# Patient Record
Sex: MALE | Race: WHITE
[De-identification: names, ages, dates, MRNs, and addresses within clinical notes are randomized per-mention and may not be internally consistent; named-entity substitution may affect disease eponyms.]

---

## 2017-01-21 ENCOUNTER — HOSPITAL ENCOUNTER (EMERGENCY)
Age: 22
Discharge: HOME | End: 2017-01-21
Payer: COMMERCIAL

## 2017-01-21 DIAGNOSIS — H65.92: Primary | ICD-10-CM

## 2017-01-21 PROCEDURE — 99283 EMERGENCY DEPT VISIT LOW MDM: CPT

## 2019-09-24 ENCOUNTER — HOSPITAL ENCOUNTER (EMERGENCY)
Dept: HOSPITAL 76 - ED | Age: 24
Discharge: HOME | End: 2019-09-24
Payer: COMMERCIAL

## 2019-09-24 VITALS — SYSTOLIC BLOOD PRESSURE: 140 MMHG | DIASTOLIC BLOOD PRESSURE: 97 MMHG
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
## 2019-09-24 DIAGNOSIS — S52.125A: Primary | ICD-10-CM
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
## 2019-09-24 PROCEDURE — 99283 EMERGENCY DEPT VISIT LOW MDM: CPT

## 2019-09-24 PROCEDURE — 73080 X-RAY EXAM OF ELBOW: CPT

## 2019-09-24 PROCEDURE — 99284 EMERGENCY DEPT VISIT MOD MDM: CPT

## 2019-09-24 NOTE — XRAY REPORT
Reason:  fall, L elbow pain

Procedure Date:  09/24/2019   

Accession Number:  [id number] / [id number]                    

Procedure:  XR  - Elbow 3 View LT CPT Code:  

 

FULL RESULT:

 

 

EXAM:

LEFT ELBOW RADIOGRAPHY

 

EXAM DATE: 9/24/2019 02:13 AM

 

CLINICAL HISTORY: Fall with elbow pain.

 

COMPARISON: None.

 

TECHNIQUE: 4 views.

 

FINDINGS:

Bones: There is a subtle nondisplaced radial head fracture.

 

Joints: Positive elbow effusion. No dislocation.

 

Soft Tissues: Equivocal mild proximal forearm soft tissue swelling.

IMPRESSION:

Subtle nondisplaced radial head fracture. Elbow effusion.

 

[facility]

## 2019-09-24 NOTE — ED PHYSICIAN DOCUMENTATION
PD HPI UPPER EXT INJURY





- Stated complaint


Stated Complaint: LEFT ELBOW PX





- Chief complaint


Chief Complaint: Trauma Ext





- History obtained from


History obtained from: Patient





- History of Present Illness


Location: Left, Elbow


Type of injury: Fall


Where injury occurred: [place]


Timing - onset: Yesterday


Timing - duration: Days (1)


Timing - details: Gradual onset


Pain level max: 9


Pain level now: 8


Improved by: Rest, Ice, Immobilization


Worsened by: Moving, Palpating


Associated symptoms: Swelling.  No: Weakness, Numbness, Tingling


Contributing factors: No: Anticoagulated


Recently seen: Not recently seen





- Additonal information


Additional information: 





pt is right handed. Fell on L elbow playing flag football.





Review of Systems


Constitutional: denies: Fever


Neurologic: denies: Focal weakness, Numbness





PD PAST MEDICAL HISTORY





- Past Medical History


Past Medical History: No





- Past Surgical History


Past Surgical History: Yes





- Present Medications


Home Medications: 


                                Ambulatory Orders











 Medication  Instructions  Recorded  Confirmed


 


Cephalexin [Keflex] 500 mg PO TID #21 capsule 01/21/17 


 


Ibuprofen [Motrin] 600 mg PO TID #20 tab 01/21/17 


 


Tramadol HCl 50 mg PO Q6H PRN #20 tablet 01/21/17 


 


Hydrocodone/Acetaminophen 1 - 2 each PO Q6H PRN #14 tablet 09/24/19 





[Hydrocodon-Acetaminophen 5-325]   














- Allergies


Allergies/Adverse Reactions: 


                                    Allergies











Allergy/AdvReac Type Severity Reaction Status Date / Time


 


No Known Drug Allergies Allergy   Verified 01/21/17 10:43














- Living Situation


Living Arrangement: reports: At home





- Social History


Does the pt smoke?: No


Smoking Status: Never smoker


Does the pt drink ETOH?: Yes


Does the pt have substance abuse?: No





- Immunizations


Immunizations are current?: Yes





PD ED PE NORMAL





- Vitals


Vital signs reviewed: Yes





- General


General: Alert and oriented X 3, No acute distress, Well developed/nourished





- Derm


Derm: Warm and dry





- Extremities


Extremities: Other (L elbow - TTP distal humerus. NVI. Pain with 

pronation/supination. Mild swelling. )





- Neuro


Neuro: Alert and oriented X 3





- Psych


Psych: Normal mood, Normal affect





Results





- Vitals


Vitals: 


                               Vital Signs - 24 hr











  09/24/19 09/24/19





  01:26 03:10


 


Temperature 36.7 C 37.5 C


 


Heart Rate 100 102 H


 


Respiratory 18 17





Rate  


 


Blood Pressure 147/98 H 140/97 H


 


O2 Saturation 99 99








                                     Oxygen











O2 Source                      Room air

















- Rads (name of study)


  ** Left elbow x-ray


Radiology: Prelim report reviewed, EMP read contemporaneously, See rad report 

(Subtle nondisplaced radial head fracture. Elbow effusion. )





PD MEDICAL DECISION MAKING





- ED course


Complexity details: reviewed results, re-evaluated patient, considered 

differential, d/w patient


ED course: 





24-year-old male with a nondisplaced left radial head fracture and small elbow 

effusion.  Placed in a sling for comfort.  Will utilize early mobilization and 

if he does not improve, will see orthopedics this week and can possibly have the

joint aspirated if needed. He will need orthopedic follow-up either way.  

Neurovascularly intact.  Will prescribe pain medication for home as well.  

Patient counseled regarding signs and symptoms for which I believe and urgent 

re-evaluation would be necessary. Patient with good understanding of and 

agreement to plan and is comfortable going home at this time





This document was made in part using voice recognition software. While efforts 

are made to proofread this document, sound alike and grammatical errors may 

occur.





Departure





- Departure


Disposition: 01 Home, Self Care


Clinical Impression: 


Left radial head fracture


Qualifiers:


 Encounter type: initial encounter Fracture type: closed Fracture alignment: 

nondisplaced Qualified Code(s): S52.125A - Nondisplaced fracture of head of left

radius, initial encounter for closed fracture





Condition: Good


Instructions:  ED Fx Radial Head


Follow-Up: 


[facility] [Provider Group]


[facility] [Provider Group]


Prescriptions: 


Hydrocodone/Acetaminophen [Hydrocodon-Acetaminophen 5-325] 1 - 2 each PO Q6H PRN

#14 tablet


 PRN Reason: pain


Comments: 


Return if you worsen.  Wear the sling for the next 2 to 3 days.  Start to gently

move the elbow after that.  Follow-up closely with orthopedics, preferably this 

week for further evaluation and care.  They may need to remove fluid from the 

elbow joint.  





Do not drink alcohol or drive while on narcotic pain medicine. 


Note that many narcotic pain relievers also contain tylenol/acetaminophen. 

Please ensure that your total dose of acetaminophen from all sources does not 

exceed 3 grams (3000mg) per day. 


You may constipated on this medication, take a stool softener such as "Colace" 

twice a day while you are on it. Also recommend a over-the-counter laxative such

as senna or MiraLAX any day that you do not have a bowel movement. 


If you received narcotic pain medication in the emergency department, do not 

drive or operate machinery for the next 24 hours.





Discharge Date/Time: 09/24/19 03:10

## 2019-11-28 ENCOUNTER — HOSPITAL ENCOUNTER (EMERGENCY)
Dept: HOSPITAL 76 - ED | Age: 24
Discharge: HOME | End: 2019-11-28
Payer: COMMERCIAL

## 2019-11-28 VITALS — DIASTOLIC BLOOD PRESSURE: 85 MMHG | SYSTOLIC BLOOD PRESSURE: 152 MMHG

## 2019-11-28 DIAGNOSIS — T78.3XXA: Primary | ICD-10-CM

## 2019-11-28 DIAGNOSIS — H66.005: ICD-10-CM

## 2019-11-28 PROCEDURE — 94640 AIRWAY INHALATION TREATMENT: CPT

## 2019-11-28 PROCEDURE — 87070 CULTURE OTHR SPECIMN AEROBIC: CPT

## 2019-11-28 PROCEDURE — 99284 EMERGENCY DEPT VISIT MOD MDM: CPT

## 2019-11-28 PROCEDURE — 87430 STREP A AG IA: CPT

## 2019-11-28 PROCEDURE — 96372 THER/PROPH/DIAG INJ SC/IM: CPT

## 2020-06-29 ENCOUNTER — HOSPITAL ENCOUNTER (EMERGENCY)
Dept: HOSPITAL 76 - ED | Age: 25
LOS: 1 days | Discharge: HOME | End: 2020-06-30
Payer: COMMERCIAL

## 2020-06-29 VITALS — SYSTOLIC BLOOD PRESSURE: 140 MMHG | DIASTOLIC BLOOD PRESSURE: 100 MMHG

## 2020-06-29 DIAGNOSIS — T78.40XA: Primary | ICD-10-CM

## 2020-06-29 DIAGNOSIS — L50.0: ICD-10-CM

## 2020-06-29 PROCEDURE — 99282 EMERGENCY DEPT VISIT SF MDM: CPT

## 2020-06-29 PROCEDURE — 99284 EMERGENCY DEPT VISIT MOD MDM: CPT

## 2020-06-29 NOTE — ED PHYSICIAN DOCUMENTATION
History of Present Illness





- Stated complaint


Stated Complaint: HIVES/THROAT SWELLING





- History obtained from


History obtained from: Patient





- History of Present Illness


Timing: How many hours ago (2)


Pain level max: 0


Pain level now: 0


Improved by: allegra, benadryl





- Additonal information


Additional information: 





approximately 2 hours PTA, patient had sudden onset swelling left side of face, 

predominantly left periorbit and left side of mouth, as well as pruritic hives 

on trunk and proximal BUE. also "itchy eyes and throat" (per patient). he took 

allegra and then benadryl and symptoms have subsequently resolved 





Review of Systems


Constitutional: denies: Fever, Chills, Sweats


Nose: denies: Rhinorrhea / runny nose, Congestion


Throat: denies: Sore throat


Cardiac: reports: Reviewed and negative


Respiratory: reports: Reviewed and negative


Skin: reports: Rash





PD PAST MEDICAL HISTORY





- Past Medical History


Past Medical History: No





- Past Surgical History


Past Surgical History: Yes





- Present Medications


Home Medications: 


                                Ambulatory Orders











 Medication  Instructions  Recorded  Confirmed


 


Cephalexin [Keflex] 500 mg PO TID #21 capsule 01/21/17 


 


Ibuprofen [Motrin] 600 mg PO TID #20 tab 01/21/17 


 


Tramadol HCl 50 mg PO Q6H PRN #20 tablet 01/21/17 


 


Hydrocodone/Acetaminophen 1 - 2 each PO Q6H PRN #14 tablet 09/24/19 





[Hydrocodon-Acetaminophen 5-325]   


 


Azithromycin [Zithromax] 250 mg PO DAILY #6 tablet 11/28/19 


 


predniSONE [Prednisone] 40 mg PO DAILY 4 Days #8 tablet 06/29/20 














- Allergies


Allergies/Adverse Reactions: 


                                    Allergies











Allergy/AdvReac Type Severity Reaction Status Date / Time


 


No Known Drug Allergies Allergy   Verified 06/29/20 23:36














- Living Situation


Living Arrangement: reports: At home





- Social History


Does the pt smoke?: No


Smoking Status: Never smoker


Does the pt drink ETOH?: Yes


Does the pt have substance abuse?: No





- Immunizations


Immunizations are current?: Yes





PD ED PE NORMAL





- Vitals


Vital signs reviewed: Yes





- General


General: Alert and oriented X 3, No acute distress, Well developed/nourished





- HEENT


HEENT: Moist mucous membranes, Pharynx benign





- Cardiac


Cardiac: RRR, No murmur





- Respiratory


Respiratory: No respiratory distress, Clear bilaterally





- Derm


Derm: Normal color, Warm and dry, No rash





Results





- Vitals


Vitals: 


                               Vital Signs - 24 hr











  06/29/20 06/30/20





  23:36 00:00


 


Temperature 36.9 C 


 


Heart Rate 102 H 96


 


Respiratory 16 18





Rate  


 


Blood Pressure 140/100 H 


 


O2 Saturation 98 99








                                     Oxygen











O2 Source                      Room air

















PD MEDICAL DECISION MAKING





- ED course


Complexity details: considered differential, d/w patient





Departure





- Departure


Disposition: 01 Home, Self Care


Clinical Impression: 


 Allergic reaction





Condition: Good


Instructions:  ED Allergic Reaction General Other


Prescriptions: 


predniSONE [Prednisone] 40 mg PO DAILY 4 Days #8 tablet


Discharge Date/Time: 06/30/20 00:00

## 2020-10-04 ENCOUNTER — HOSPITAL ENCOUNTER (EMERGENCY)
Dept: HOSPITAL 76 - ED | Age: 25
Discharge: HOME | End: 2020-10-04
Payer: COMMERCIAL

## 2020-10-04 VITALS — SYSTOLIC BLOOD PRESSURE: 147 MMHG | DIASTOLIC BLOOD PRESSURE: 96 MMHG

## 2020-10-04 DIAGNOSIS — R10.32: Primary | ICD-10-CM

## 2020-10-04 LAB
ALBUMIN DIAFP-MCNC: 4.6 G/DL (ref 3.2–5.5)
ALBUMIN/GLOB SERPL: 1.3 {RATIO} (ref 1–2.2)
ALP SERPL-CCNC: 91 IU/L (ref 42–121)
ALT SERPL W P-5'-P-CCNC: 69 IU/L (ref 10–60)
ANION GAP SERPL CALCULATED.4IONS-SCNC: 8 MMOL/L (ref 6–13)
AST SERPL W P-5'-P-CCNC: 34 IU/L (ref 10–42)
BASOPHILS NFR BLD AUTO: 0.1 10^3/UL (ref 0–0.1)
BASOPHILS NFR BLD AUTO: 0.9 %
BILIRUB BLD-MCNC: 1 MG/DL (ref 0.2–1)
BUN SERPL-MCNC: 13 MG/DL (ref 6–20)
CALCIUM UR-MCNC: 9.7 MG/DL (ref 8.5–10.3)
CHLORIDE SERPL-SCNC: 105 MMOL/L (ref 101–111)
CLARITY UR REFRACT.AUTO: CLEAR
CO2 SERPL-SCNC: 28 MMOL/L (ref 21–32)
CREAT SERPLBLD-SCNC: 0.9 MG/DL (ref 0.6–1.2)
EOSINOPHIL # BLD AUTO: 0.2 10^3/UL (ref 0–0.7)
EOSINOPHIL NFR BLD AUTO: 2.9 %
ERYTHROCYTE [DISTWIDTH] IN BLOOD BY AUTOMATED COUNT: 12.3 % (ref 12–15)
GLOBULIN SER-MCNC: 3.5 G/DL (ref 2.1–4.2)
GLUCOSE SERPL-MCNC: 100 MG/DL (ref 70–100)
GLUCOSE UR QL STRIP.AUTO: NEGATIVE MG/DL
HGB UR QL STRIP: 16.9 G/DL (ref 14–18)
KETONES UR QL STRIP.AUTO: NEGATIVE MG/DL
LIPASE SERPL-CCNC: 23 U/L (ref 22–51)
LYMPHOCYTES # SPEC AUTO: 2.1 10^3/UL (ref 1.5–3.5)
LYMPHOCYTES NFR BLD AUTO: 27.2 %
MCH RBC QN AUTO: 29.6 PG (ref 27–31)
MCHC RBC AUTO-ENTMCNC: 33.7 G/DL (ref 32–36)
MCV RBC AUTO: 88.1 FL (ref 80–94)
MONOCYTES # BLD AUTO: 1.2 10^3/UL (ref 0–1)
MONOCYTES NFR BLD AUTO: 15.7 %
NEUTROPHILS # BLD AUTO: 4 10^3/UL (ref 1.5–6.6)
NEUTROPHILS # SNV AUTO: 7.6 X10^3/UL (ref 4.8–10.8)
NEUTROPHILS NFR BLD AUTO: 52.8 %
NITRITE UR QL STRIP.AUTO: NEGATIVE
PDW BLD AUTO: 8.9 FL (ref 7.4–11.4)
PH UR STRIP.AUTO: 6 PH (ref 5–7.5)
PLATELET # BLD: 260 10^3/UL (ref 130–450)
PROT SPEC-MCNC: 8.1 G/DL (ref 6.7–8.2)
PROT UR STRIP.AUTO-MCNC: NEGATIVE MG/DL
RBC # UR STRIP.AUTO: NEGATIVE /UL
RBC MAR: 5.7 10^6/UL (ref 4.7–6.1)
SODIUM SERPLBLD-SCNC: 141 MMOL/L (ref 135–145)
SP GR UR STRIP.AUTO: 1.02 (ref 1–1.03)
UROBILINOGEN UR QL STRIP.AUTO: (no result) E.U./DL
UROBILINOGEN UR STRIP.AUTO-MCNC: NEGATIVE MG/DL

## 2020-10-04 PROCEDURE — 85025 COMPLETE CBC W/AUTO DIFF WBC: CPT

## 2020-10-04 PROCEDURE — 74176 CT ABD & PELVIS W/O CONTRAST: CPT

## 2020-10-04 PROCEDURE — 81001 URINALYSIS AUTO W/SCOPE: CPT

## 2020-10-04 PROCEDURE — 83690 ASSAY OF LIPASE: CPT

## 2020-10-04 PROCEDURE — 80053 COMPREHEN METABOLIC PANEL: CPT

## 2020-10-04 PROCEDURE — 99284 EMERGENCY DEPT VISIT MOD MDM: CPT

## 2020-10-04 PROCEDURE — 36415 COLL VENOUS BLD VENIPUNCTURE: CPT

## 2020-10-04 PROCEDURE — 87086 URINE CULTURE/COLONY COUNT: CPT

## 2020-10-04 PROCEDURE — 81003 URINALYSIS AUTO W/O SCOPE: CPT

## 2020-10-04 NOTE — ED PHYSICIAN DOCUMENTATION
History of Present Illness





- Stated complaint


Stated Complaint: LOWER ABDOMINAL PX





- Chief complaint


Chief Complaint: Abd Pain





- History obtained from


History obtained from: Patient





- Additonal information


Additional information: 


Patient comes emergency department complaining of left inguinal pain for the 

last couple of days.  He states that when he sits down or twists, he gets a 

sharp pain in the area.  He has not been doing any heavy lifting or repetitive 

movements, and he denies any personal or family history of hernia.  No direct 

trauma.  Patient has not noticed any swelling.  No Pain in the scrotum or 

testicles.  No swelling.  No penile discharge or dysuria.  No hematuria.  

Patient has no history of kidney stones.  No abdominal pain, fevers, or nausea. 

No diarrhea or constipation.  No blood in the stools.  No other complaints at 

this time.








Review of Systems


Ten Systems: 10 systems reviewed and negative


Constitutional: reports: Reviewed and negative


Eyes: reports: Reviewed and negative


Ears: reports: Reviewed and negative


Nose: reports: Reviewed and negative


Throat: reports: Reviewed and negative


Cardiac: reports: Reviewed and negative


Respiratory: reports: Reviewed and negative


GI: reports: Abdominal Pain (Left pelvis/inguinal area).  denies: Nausea, 

Vomiting


: reports: Reviewed and negative


Skin: reports: Reviewed and negative


Musculoskeletal: reports: Reviewed and negative.  denies: Back pain, Extremity 

pain, Joint pain


Neurologic: reports: Reviewed and negative


Psychiatric: reports: Reviewed and negative


Endocrine: reports: Reviewed and negative


Immunocompromised: reports: Reviewed and negative





PD PAST MEDICAL HISTORY





- Past Surgical History


Past Surgical History: Yes





- Present Medications


Home Medications: 


                                Ambulatory Orders











 Medication  Instructions  Recorded  Confirmed


 


Cephalexin [Keflex] 500 mg PO TID #21 capsule 01/21/17 


 


Ibuprofen [Motrin] 600 mg PO TID #20 tab 01/21/17 


 


Tramadol HCl 50 mg PO Q6H PRN #20 tablet 01/21/17 


 


Hydrocodone/Acetaminophen 1 - 2 each PO Q6H PRN #14 tablet 09/24/19 





[Hydrocodon-Acetaminophen 5-325]   


 


Azithromycin [Zithromax] 250 mg PO DAILY #6 tablet 11/28/19 


 


predniSONE [Prednisone] 40 mg PO DAILY 4 Days #8 tablet 06/29/20 














- Allergies


Allergies/Adverse Reactions: 


                                    Allergies











Allergy/AdvReac Type Severity Reaction Status Date / Time


 


No Known Drug Allergies Allergy   Verified 10/04/20 10:31














- Social History


Does the pt smoke?: No


Smoking Status: Never smoker


Does the pt drink ETOH?: Yes


Does the pt have substance abuse?: No





- Immunizations


Immunizations are current?: Yes





PD ED PE NORMAL





- Vitals


Vital signs reviewed: Yes





- General


General: Alert and oriented X 3, No acute distress





- HEENT


HEENT: Atraumatic, PERRL, EOMI, Moist mucous membranes





- Neck


Neck: Supple, no meningeal sign





- Cardiac


Cardiac: RRR, No murmur, Strong equal pulses





- Respiratory


Respiratory: No respiratory distress, Clear bilaterally





- Abdomen


Abdomen: Soft, Non distended, Other (Tenderness without swelling over left 

inguinal area.  Mild left pelvic tenderness as well.)





- Derm


Derm: Normal color, Warm and dry, No rash





- Extremities


Extremities: No deformity





- Neuro


Neuro: Alert and oriented X 3





- Psych


Psych: Normal mood, Normal affect





Results





- Vitals


Vitals: 


                               Vital Signs - 24 hr











  10/04/20 10/04/20





  10:27 13:14


 


Temperature 36.2 C L 36.9 C


 


Heart Rate 79 82


 


Respiratory 16 14





Rate  


 


Blood Pressure 136/88 H 147/96 H


 


O2 Saturation 100 98








                                     Oxygen











O2 Source                      Room air

















- Labs


Labs: 


                                Laboratory Tests











  10/04/20 10/04/20 10/04/20





  11:20 11:20 12:42


 


WBC  7.6  


 


RBC  5.70  


 


Hgb  16.9  


 


Hct  50.2  


 


MCV  88.1  


 


MCH  29.6  


 


MCHC  33.7  


 


RDW  12.3  


 


Plt Count  260  


 


MPV  8.9  


 


Neut # (Auto)  4.0  


 


Lymph # (Auto)  2.1  


 


Mono # (Auto)  1.2 H  


 


Eos # (Auto)  0.2  


 


Baso # (Auto)  0.1  


 


Absolute Nucleated RBC  0.00  


 


Nucleated RBC %  0.0  


 


Sodium   141 


 


Potassium   4.2 


 


Chloride   105 


 


Carbon Dioxide   28 


 


Anion Gap   8.0 


 


BUN   13 


 


Creatinine   0.9 


 


Estimated GFR (MDRD)   103 


 


Glucose   100 


 


Calcium   9.7 


 


Total Bilirubin   1.0 


 


AST   34 


 


ALT   69 H 


 


Alkaline Phosphatase   91 


 


Total Protein   8.1 


 


Albumin   4.6 


 


Globulin   3.5 


 


Albumin/Globulin Ratio   1.3 


 


Lipase   23 


 


Urine Color    YELLOW


 


Urine Clarity    CLEAR


 


Urine pH    6.0


 


Ur Specific Gravity    1.025


 


Urine Protein    NEGATIVE


 


Urine Glucose (UA)    NEGATIVE


 


Urine Ketones    NEGATIVE


 


Urine Occult Blood    NEGATIVE


 


Urine Nitrite    NEGATIVE


 


Urine Bilirubin    NEGATIVE


 


Urine Urobilinogen    0.2 (NORMAL)


 


Ur Leukocyte Esterase    NEGATIVE


 


Ur Microscopic Review    NOT INDICATED


 


Urine Culture Comments    NOT INDICATED














- Rads (name of study)


  ** CT abd/pelvis


Radiology: Final report received, EMP read indepedently, See rad report 

(Possible minimal mural thickening of transverse colon; however, limited 

evaluation given decompressed status.)





PD MEDICAL DECISION MAKING





- ED course


Complexity details: reviewed old records, reviewed results, re-evaluated 

patient, considered differential, d/w patient


ED course: 


The patient wounds worked up with labs and CT scan of the abdomen pelvis without

 contrast.  Work-up was unremarkable.  The CT scan was read as showing possible 

colitis versus normal finding, and clinically, I did not feel that this patient 

had colitis.  I discussed with him that he may have strained his inguinal area, 

or that he could be in the process of developing a hernia.  At this point in 

time, the patient should avoid heavy lifting or other strenuous activities 

involving the area and may use over-the-counter analgesics/anti-inflammatories. 

 We have discussed the need for follow-up with his primary care physician as 

well as the usual indications for return.








Departure





- Departure


Disposition: 01 Home, Self Care


Clinical Impression: 


 Deep inguinal pain, left





Condition: Stable


Instructions:  ED Pelvic Pain [facility]


Comments: 


Your labs look good.  Your CT scan does not show any obvious cause for your 

pain.  As we have discussed, you may have strained the inguinal ligament which 

helps to stabilize your pelvis on each side.  However, you may be in a "pre-

hernia" stage where some of the connective tissue fibers are beginning to tear 

and cause pain but there is no actual opening for your intra-abdominal contents 

to bulge through.  If the pain continues to get worse, or if you start to notice

 a bulge in the area, you should have a recheck by your primary doctor and 

potentially, a referral to a surgeon to discuss your options.  Please avoid 

heavy lifting, straining, or any other action at this point in time that would 

worsen the pain.  You may take ibuprofen and Tylenol as needed for the 

discomfort.


Discharge Date/Time: 10/04/20 13:19

## 2020-10-04 NOTE — CT REPORT
PROCEDURE:  Abdomen/Pelvis WO

 

INDICATIONS:  L flank pain

 

TECHNIQUE:  

Noncontrast 5 mm thick sections acquired from the diaphragms to the symphysis.  5 mm coronal and sagi
ttal reformats were then performed.  For radiation dose reduction, the following was used:  automated
 exposure control, adjustment of mA and/or kV according to patient size.

 

COMPARISON:  None.

 

FINDINGS:  

Image quality:  Excellent.  

 

ABDOMEN:  

Lung bases:  Lung bases are clear.  Heart size is normal.  

 

Solid organs:  Liver and spleen are normal in size.  Gallbladder negative  Pancreas is normal in cont
ours.  No adrenal nodules.  Kidneys are normal in size, without hydronephrosis or nephrolithiasis.  

 

Peritoneum and bowel: The transverse colon is decompressed although there is some suggestion of mild 
mural thickening. No pericolic fat stranding or inflammation is seen. No free fluid or air.  

 

Nodes and vessels:  No retroperitoneal or mesenteric adenopathy by size criteria.  Aorta and inferior
 vena cava are normal in caliber.  

 

Miscellaneous:  No ventral hernias.  

 

 

PELVIS:  

Genitourinary:  Bladder wall thickness is normal.  

 

Miscellaneous:  No inguinal hernias or adenopathy.  

 

Bones:  No suspicious bony lesions.  No vertebral body compression fractures.  

 

IMPRESSION:  

Possible minimal mural thickening of the transverse colon however limited evaluation given decompress
ed status and this finding technically age indeterminate. Theoretically this could reflect low-grade 
colitis although recommend close clinical and laboratory correlation. Elsewhere, no acute abnormality
. Normal appearance of the appendix.

 

No nephrolithiasis. No specific visualized etiology for left abdominal pain

 

 

Reviewed by: Celso Stone MD on 10/4/2020 12:54 PM PDT

Approved by: Celso Stone MD on 10/4/2020 12:54 PM PDT

 

 

Station ID:  [id number]